# Patient Record
Sex: FEMALE | Race: WHITE | Employment: OTHER | URBAN - NONMETROPOLITAN AREA
[De-identification: names, ages, dates, MRNs, and addresses within clinical notes are randomized per-mention and may not be internally consistent; named-entity substitution may affect disease eponyms.]

---

## 2018-11-26 ENCOUNTER — HOSPITAL ENCOUNTER (OUTPATIENT)
Age: 83
Discharge: HOME | End: 2018-11-26
Payer: MEDICARE

## 2018-11-26 LAB
ANION GAP SERPL CALCULATED.3IONS-SCNC: 15 MEQ/L (ref 8–16)
BUN BLDV-MCNC: 22 MG/DL (ref 7–22)
CALCIUM SERPL-MCNC: 9.8 MG/DL (ref 8.5–10.5)
CHLORIDE BLD-SCNC: 102 MEQ/L (ref 98–111)
CO2: 23 MEQ/L (ref 23–33)
CREAT SERPL-MCNC: 0.8 MG/DL (ref 0.4–1.2)
GFR SERPL CREATININE-BSD FRML MDRD: 69 ML/MIN/1.73M2
GLUCOSE BLD-MCNC: 78 MG/DL (ref 70–108)
POTASSIUM SERPL-SCNC: 4.2 MEQ/L (ref 3.5–5.2)
SODIUM BLD-SCNC: 140 MEQ/L (ref 135–145)

## 2018-11-26 PROCEDURE — 80048 BASIC METABOLIC PNL TOTAL CA: CPT

## 2018-11-26 PROCEDURE — 36415 COLL VENOUS BLD VENIPUNCTURE: CPT

## 2018-12-05 ENCOUNTER — HOSPITAL ENCOUNTER (EMERGENCY)
Age: 83
Discharge: HOME | End: 2018-12-05
Attending: EMERGENCY MEDICINE
Payer: MEDICARE

## 2018-12-05 VITALS
SYSTOLIC BLOOD PRESSURE: 177 MMHG | HEART RATE: 75 BPM | HEIGHT: 66 IN | WEIGHT: 183 LBS | BODY MASS INDEX: 29.41 KG/M2 | OXYGEN SATURATION: 97 % | TEMPERATURE: 97 F | DIASTOLIC BLOOD PRESSURE: 95 MMHG | RESPIRATION RATE: 18 BRPM

## 2018-12-05 DIAGNOSIS — J20.9 ACUTE BRONCHITIS DUE TO INFECTION: Primary | ICD-10-CM

## 2018-12-05 DIAGNOSIS — Z79.01 WARFARIN ANTICOAGULATION: ICD-10-CM

## 2018-12-05 DIAGNOSIS — R03.0 ELEVATED BLOOD PRESSURE READING: ICD-10-CM

## 2018-12-05 DIAGNOSIS — I48.91 ATRIAL FIBRILLATION, UNSPECIFIED TYPE (HCC): ICD-10-CM

## 2018-12-05 PROCEDURE — 99213 OFFICE O/P EST LOW 20 MIN: CPT

## 2018-12-05 PROCEDURE — 99203 OFFICE O/P NEW LOW 30 MIN: CPT | Performed by: EMERGENCY MEDICINE

## 2018-12-05 RX ORDER — LATANOPROST 50 UG/ML
1 SOLUTION/ DROPS OPHTHALMIC NIGHTLY
COMMUNITY

## 2018-12-05 RX ORDER — CARVEDILOL 3.12 MG/1
3.12 TABLET ORAL 2 TIMES DAILY WITH MEALS
COMMUNITY

## 2018-12-05 RX ORDER — CEFDINIR 300 MG/1
300 CAPSULE ORAL 2 TIMES DAILY
Qty: 14 CAPSULE | Refills: 0 | Status: SHIPPED | OUTPATIENT
Start: 2018-12-05 | End: 2018-12-12

## 2018-12-05 RX ORDER — ISOSORBIDE MONONITRATE 60 MG/1
60 TABLET, EXTENDED RELEASE ORAL DAILY
COMMUNITY

## 2018-12-05 RX ORDER — SPIRONOLACTONE 25 MG/1
25 TABLET ORAL DAILY
COMMUNITY

## 2018-12-05 RX ORDER — TRAMADOL HYDROCHLORIDE 50 MG/1
50 TABLET ORAL EVERY 6 HOURS PRN
COMMUNITY

## 2018-12-05 RX ORDER — BENZONATATE 200 MG/1
200 CAPSULE ORAL 3 TIMES DAILY PRN
Qty: 15 CAPSULE | Refills: 0 | Status: SHIPPED | OUTPATIENT
Start: 2018-12-05 | End: 2018-12-20

## 2018-12-05 RX ORDER — FUROSEMIDE 40 MG/1
40 TABLET ORAL 2 TIMES DAILY
COMMUNITY

## 2018-12-05 RX ORDER — WARFARIN SODIUM 3 MG/1
TABLET ORAL DAILY
COMMUNITY

## 2018-12-05 RX ORDER — POTASSIUM CHLORIDE 1.5 G/1.77G
20 POWDER, FOR SOLUTION ORAL 3 TIMES DAILY
COMMUNITY

## 2018-12-05 ASSESSMENT — ENCOUNTER SYMPTOMS
NAUSEA: 0
BACK PAIN: 0
STRIDOR: 0
SHORTNESS OF BREATH: 0
SORE THROAT: 0
VOICE CHANGE: 0
BLOOD IN STOOL: 0
WHEEZING: 0
CONSTIPATION: 0
VOMITING: 0
EYE PAIN: 0
EYE DISCHARGE: 0
SINUS PRESSURE: 0
ABDOMINAL PAIN: 0
TROUBLE SWALLOWING: 0
RHINORRHEA: 1
DIARRHEA: 0
EYE REDNESS: 0
COUGH: 1
CHOKING: 0
FACIAL SWELLING: 0

## 2018-12-05 NOTE — ED PROVIDER NOTES
[address]       Chief Complaint   Patient presents with    Cough       Nurses Notes reviewed and I agree except as noted in the HPI. HISTORY OF PRESENT ILLNESS   Lionel Maldonado is a 81 y.o. female who presents With one-week history of cough productive of white sputum, fatigue, malaise, postnasal drainage, congestion. No fever, vomiting, chest pain, shortness of breath, leg pain or leg swelling, hemoptysis, dizziness, syncope, rash,  symptoms. No weight gain. From [place], currently visiting her daughter. History of hypertension, COPD, CHF, CAD, atrial fibrillation on Coumadin. On oxygen 2-2.5 L.   UTD Influenza, Pneumovax and Prevnar  REVIEW OF SYSTEMS     Review of Systems   Constitutional: Positive for appetite change. Negative for chills, fatigue, fever and unexpected weight change. HENT: Positive for congestion, postnasal drip and rhinorrhea. Negative for ear discharge, ear pain, facial swelling, hearing loss, nosebleeds, sinus pressure, sore throat, trouble swallowing and voice change. Eyes: Negative for pain, discharge, redness and visual disturbance. Respiratory: Positive for cough. Negative for choking, shortness of breath, wheezing and stridor. Cardiovascular: Negative for chest pain and leg swelling. Gastrointestinal: Negative for abdominal pain, blood in stool, constipation, diarrhea, nausea and vomiting. Genitourinary: Negative for dysuria, flank pain, frequency, hematuria, urgency, vaginal bleeding and vaginal discharge. Musculoskeletal: Negative for arthralgias, back pain, neck pain and neck stiffness. Skin: Negative for rash. Neurological: Negative for dizziness, seizures, syncope, weakness, light-headedness and headaches. Hematological: Negative for adenopathy. Does not bruise/bleed easily. Psychiatric/Behavioral: Negative for confusion, sleep disturbance and suicidal ideas.  The patient is not nervous/anxious. All other systems reviewed and are negative. PAST MEDICAL HISTORY         Diagnosis Date    Atrial fibrillation ([facility] [place] [address]     CAD (coronary artery disease)     CHF (congestive heart failure) (HCC)     COPD (chronic obstructive pulmonary disease) (HCC)     Glaucoma     Hypertension     Kidney stone        SURGICAL HISTORY     Patient  has a past surgical history that includes Coronary artery bypass graft; Cholecystectomy; Vein Surgery; Coronary angioplasty with stent; and Hysterectomy. CURRENT MEDICATIONS       Discharge Medication List as of 12/5/2018  4:54 PM      CONTINUE these medications which have NOT CHANGED    Details   Mirabegron ER (MYRBETRIQ) 50 MG TB24 Take by mouthHistorical Med      furosemide (LASIX) 40 MG tablet Take 40 mg by mouth 2 times dailyHistorical Med      latanoprost (XALATAN) 0.005 % ophthalmic solution 1 drop nightlyHistorical Med      OXYGEN Inhale into the lungsHistorical Med      potassium chloride (KLOR-CON) 20 MEQ packet Take 20 mEq by mouth 3 times dailyHistorical Med      spironolactone (ALDACTONE) 25 MG tablet Take 25 mg by mouth dailyHistorical Med      timolol (BETIMOL) 0.5 % ophthalmic solution 1 drop 2 times dailyHistorical Med      warfarin (COUMADIN) 3 MG tablet Take by mouth dailyHistorical Med      isosorbide mononitrate (IMDUR) 60 MG extended release tablet Take 60 mg by mouth dailyHistorical Med      carvedilol (COREG) 3.125 MG tablet Take 3.125 mg by mouth 2 times daily (with meals)Historical Med      traMADol (ULTRAM) 50 MG tablet Take 50 mg by mouth every 6 hours as needed for Pain. Michelle Sieve Historical Med             ALLERGIES     Patient is is allergic to pcn [penicillins]. FAMILY HISTORY     Patient'sfamily history is not on file. SOCIAL HISTORY     Patient  reports that she has never smoked. She has never used smokeless tobacco. She reports that she does not drink alcohol or use drugs.     PHYSICAL EXAM     ED TRIAGE VITALS  BP: (!) for Cough Caution swallow whole do not chew, Disp-15 capsule, R-0Print      cefdinir (OMNICEF) 300 MG capsule Take 1 capsule by mouth 2 times daily for 7 days, Disp-14 capsule, R-0Print           Discharge Medication List as of 12/5/2018  4:54 PM          MD Yenifer Baeza MD  12/05/18 1946

## 2024-01-06 ENCOUNTER — APPOINTMENT (OUTPATIENT)
Dept: GENERAL RADIOLOGY | Age: 89
DRG: 308 | End: 2024-01-06
Payer: MEDICARE

## 2024-01-06 ENCOUNTER — APPOINTMENT (OUTPATIENT)
Dept: INTERVENTIONAL RADIOLOGY/VASCULAR | Age: 89
DRG: 308 | End: 2024-01-06
Payer: MEDICARE

## 2024-01-06 ENCOUNTER — APPOINTMENT (OUTPATIENT)
Dept: CT IMAGING | Age: 89
DRG: 308 | End: 2024-01-06
Payer: MEDICARE

## 2024-01-06 ENCOUNTER — HOSPITAL ENCOUNTER (INPATIENT)
Age: 89
LOS: 2 days | Discharge: HOME | DRG: 308 | End: 2024-01-08
Attending: EMERGENCY MEDICINE | Admitting: INTERNAL MEDICINE
Payer: MEDICARE

## 2024-01-06 DIAGNOSIS — I48.91 ATRIAL FIBRILLATION, UNSPECIFIED TYPE (HCC): ICD-10-CM

## 2024-01-06 DIAGNOSIS — E87.0 HYPERNATREMIA: ICD-10-CM

## 2024-01-06 DIAGNOSIS — R09.89 DECREASED TISSUE PERFUSION: ICD-10-CM

## 2024-01-06 DIAGNOSIS — S89.92XS: ICD-10-CM

## 2024-01-06 DIAGNOSIS — E87.6 HYPOKALEMIA: ICD-10-CM

## 2024-01-06 DIAGNOSIS — I26.93 SINGLE SUBSEGMENTAL PULMONARY EMBOLISM WITHOUT ACUTE COR PULMONALE (HCC): Primary | ICD-10-CM

## 2024-01-06 DIAGNOSIS — R00.1 BRADYCARDIA: ICD-10-CM

## 2024-01-06 DIAGNOSIS — R00.1 SYMPTOMATIC BRADYCARDIA: ICD-10-CM

## 2024-01-06 DIAGNOSIS — S12.100S CLOSED ODONTOID FRACTURE, SEQUELA: ICD-10-CM

## 2024-01-06 DIAGNOSIS — S92.345A NONDISPLACED FRACTURE OF FOURTH METATARSAL BONE, LEFT FOOT, INITIAL ENCOUNTER FOR CLOSED FRACTURE: ICD-10-CM

## 2024-01-06 LAB
ALBUMIN SERPL BCG-MCNC: 3.6 G/DL (ref 3.5–5.1)
ALP SERPL-CCNC: 99 U/L (ref 38–126)
ALT SERPL W/O P-5'-P-CCNC: 8 U/L (ref 11–66)
ANION GAP SERPL CALC-SCNC: 11 MEQ/L (ref 8–16)
APTT PPP: 39.2 SECONDS (ref 22–38)
AST SERPL-CCNC: 12 U/L (ref 5–40)
BASOPHILS ABSOLUTE: 0 THOU/MM3 (ref 0–0.1)
BASOPHILS NFR BLD AUTO: 0.4 %
BILIRUB SERPL-MCNC: 0.4 MG/DL (ref 0.3–1.2)
BUN SERPL-MCNC: 13 MG/DL (ref 7–22)
CALCIUM SERPL-MCNC: 9 MG/DL (ref 8.5–10.5)
CHLORIDE SERPL-SCNC: 113 MEQ/L (ref 98–111)
CO2 SERPL-SCNC: 25 MEQ/L (ref 23–33)
CREAT SERPL-MCNC: 0.7 MG/DL (ref 0.4–1.2)
DEPRECATED RDW RBC AUTO: 54 FL (ref 35–45)
EOSINOPHIL NFR BLD AUTO: 1.4 %
EOSINOPHILS ABSOLUTE: 0.1 THOU/MM3 (ref 0–0.4)
ERYTHROCYTE [DISTWIDTH] IN BLOOD BY AUTOMATED COUNT: 15.1 % (ref 11.5–14.5)
GFR SERPL CREATININE-BSD FRML MDRD: > 60 ML/MIN/1.73M2
GLUCOSE SERPL-MCNC: 95 MG/DL (ref 70–108)
HCT VFR BLD AUTO: 38 % (ref 37–47)
HGB BLD-MCNC: 12.3 GM/DL (ref 12–16)
IMM GRANULOCYTES # BLD AUTO: 0.02 THOU/MM3 (ref 0–0.07)
IMM GRANULOCYTES NFR BLD AUTO: 0.4 %
INR PPP: 1.22 (ref 0.85–1.13)
LYMPHOCYTES ABSOLUTE: 1.4 THOU/MM3 (ref 1–4.8)
LYMPHOCYTES NFR BLD AUTO: 28.5 %
MCH RBC QN AUTO: 32.8 PG (ref 26–33)
MCHC RBC AUTO-ENTMCNC: 32.4 GM/DL (ref 32.2–35.5)
MCV RBC AUTO: 101.3 FL (ref 81–99)
MONOCYTES ABSOLUTE: 0.4 THOU/MM3 (ref 0.4–1.3)
MONOCYTES NFR BLD AUTO: 8.8 %
NEUTROPHILS NFR BLD AUTO: 60.5 %
NRBC BLD AUTO-RTO: 0 /100 WBC
OSMOLALITY SERPL CALC.SUM OF ELEC: 296.1 MOSMOL/KG (ref 275–300)
PLATELET # BLD AUTO: 175 THOU/MM3 (ref 130–400)
PMV BLD AUTO: 9.9 FL (ref 9.4–12.4)
POTASSIUM SERPL-SCNC: 2.8 MEQ/L (ref 3.5–5.2)
PROT SERPL-MCNC: 5.4 G/DL (ref 6.1–8)
RBC # BLD AUTO: 3.75 MILL/MM3 (ref 4.2–5.4)
SEGMENTED NEUTROPHILS ABSOLUTE COUNT: 3 THOU/MM3 (ref 1.8–7.7)
SODIUM SERPL-SCNC: 149 MEQ/L (ref 135–145)
TROPONIN, HIGH SENSITIVITY: 21 NG/L (ref 0–12)
WBC # BLD AUTO: 5 THOU/MM3 (ref 4.8–10.8)

## 2024-01-06 PROCEDURE — 6360000004 HC RX CONTRAST MEDICATION: Performed by: EMERGENCY MEDICINE

## 2024-01-06 PROCEDURE — 93931 UPPER EXTREMITY STUDY: CPT

## 2024-01-06 PROCEDURE — 85610 PROTHROMBIN TIME: CPT

## 2024-01-06 PROCEDURE — 71275 CT ANGIOGRAPHY CHEST: CPT

## 2024-01-06 PROCEDURE — 85730 THROMBOPLASTIN TIME PARTIAL: CPT

## 2024-01-06 PROCEDURE — 85025 COMPLETE CBC W/AUTO DIFF WBC: CPT

## 2024-01-06 PROCEDURE — 96375 TX/PRO/DX INJ NEW DRUG ADDON: CPT

## 2024-01-06 PROCEDURE — 2140000000 HC CCU INTERMEDIATE R&B

## 2024-01-06 PROCEDURE — 80053 COMPREHEN METABOLIC PANEL: CPT

## 2024-01-06 PROCEDURE — 84484 ASSAY OF TROPONIN QUANT: CPT

## 2024-01-06 PROCEDURE — 85520 HEPARIN ASSAY: CPT

## 2024-01-06 PROCEDURE — 73564 X-RAY EXAM KNEE 4 OR MORE: CPT

## 2024-01-06 PROCEDURE — 93005 ELECTROCARDIOGRAM TRACING: CPT | Performed by: EMERGENCY MEDICINE

## 2024-01-06 PROCEDURE — 6360000002 HC RX W HCPCS: Performed by: EMERGENCY MEDICINE

## 2024-01-06 PROCEDURE — 96376 TX/PRO/DX INJ SAME DRUG ADON: CPT

## 2024-01-06 PROCEDURE — 93925 LOWER EXTREMITY STUDY: CPT

## 2024-01-06 PROCEDURE — 96365 THER/PROPH/DIAG IV INF INIT: CPT

## 2024-01-06 PROCEDURE — 99285 EMERGENCY DEPT VISIT HI MDM: CPT

## 2024-01-06 PROCEDURE — 36415 COLL VENOUS BLD VENIPUNCTURE: CPT

## 2024-01-06 PROCEDURE — 73610 X-RAY EXAM OF ANKLE: CPT

## 2024-01-06 PROCEDURE — 2580000003 HC RX 258: Performed by: EMERGENCY MEDICINE

## 2024-01-06 PROCEDURE — 73630 X-RAY EXAM OF FOOT: CPT

## 2024-01-06 PROCEDURE — 73590 X-RAY EXAM OF LOWER LEG: CPT

## 2024-01-06 RX ORDER — SODIUM CHLORIDE 0.9 % (FLUSH) 0.9 %
5-40 SYRINGE (ML) INJECTION EVERY 12 HOURS SCHEDULED
Status: DISCONTINUED | OUTPATIENT
Start: 2024-01-07 | End: 2024-01-08 | Stop reason: HOSPADM

## 2024-01-06 RX ORDER — SODIUM CHLORIDE 0.9 % (FLUSH) 0.9 %
5-40 SYRINGE (ML) INJECTION PRN
Status: DISCONTINUED | OUTPATIENT
Start: 2024-01-06 | End: 2024-01-08 | Stop reason: HOSPADM

## 2024-01-06 RX ORDER — MAGNESIUM SULFATE IN WATER 40 MG/ML
2000 INJECTION, SOLUTION INTRAVENOUS PRN
Status: DISCONTINUED | OUTPATIENT
Start: 2024-01-06 | End: 2024-01-08 | Stop reason: HOSPADM

## 2024-01-06 RX ORDER — OXCARBAZEPINE 300 MG/1
300 TABLET, FILM COATED ORAL 2 TIMES DAILY
COMMUNITY

## 2024-01-06 RX ORDER — ONDANSETRON 2 MG/ML
4 INJECTION INTRAMUSCULAR; INTRAVENOUS ONCE
Status: COMPLETED | OUTPATIENT
Start: 2024-01-06 | End: 2024-01-06

## 2024-01-06 RX ORDER — ARIPIPRAZOLE 2 MG/1
2 TABLET ORAL NIGHTLY
COMMUNITY

## 2024-01-06 RX ORDER — HEPARIN SODIUM 1000 [USP'U]/ML
80 INJECTION, SOLUTION INTRAVENOUS; SUBCUTANEOUS ONCE
Status: COMPLETED | OUTPATIENT
Start: 2024-01-06 | End: 2024-01-06

## 2024-01-06 RX ORDER — HEPARIN SODIUM 1000 [USP'U]/ML
80 INJECTION, SOLUTION INTRAVENOUS; SUBCUTANEOUS ONCE
Status: DISCONTINUED | OUTPATIENT
Start: 2024-01-06 | End: 2024-01-06

## 2024-01-06 RX ORDER — ONDANSETRON 2 MG/ML
4 INJECTION INTRAMUSCULAR; INTRAVENOUS EVERY 6 HOURS PRN
Status: DISCONTINUED | OUTPATIENT
Start: 2024-01-06 | End: 2024-01-08 | Stop reason: HOSPADM

## 2024-01-06 RX ORDER — POLYETHYLENE GLYCOL 3350 17 G/17G
17 POWDER, FOR SOLUTION ORAL DAILY PRN
Status: DISCONTINUED | OUTPATIENT
Start: 2024-01-06 | End: 2024-01-08 | Stop reason: HOSPADM

## 2024-01-06 RX ORDER — ACETAMINOPHEN 325 MG/1
650 TABLET ORAL EVERY 6 HOURS PRN
Status: DISCONTINUED | OUTPATIENT
Start: 2024-01-06 | End: 2024-01-08 | Stop reason: HOSPADM

## 2024-01-06 RX ORDER — ACETAMINOPHEN 650 MG/1
650 SUPPOSITORY RECTAL EVERY 6 HOURS PRN
Status: DISCONTINUED | OUTPATIENT
Start: 2024-01-06 | End: 2024-01-08 | Stop reason: HOSPADM

## 2024-01-06 RX ORDER — POTASSIUM CHLORIDE 20 MEQ/1
60 TABLET, EXTENDED RELEASE ORAL ONCE
Status: DISCONTINUED | OUTPATIENT
Start: 2024-01-06 | End: 2024-01-08 | Stop reason: HOSPADM

## 2024-01-06 RX ORDER — IBUPROFEN 600 MG/1
1 TABLET ORAL ONCE
Status: COMPLETED | OUTPATIENT
Start: 2024-01-06 | End: 2024-01-06

## 2024-01-06 RX ORDER — POTASSIUM CHLORIDE 20 MEQ/1
40 TABLET, EXTENDED RELEASE ORAL PRN
Status: DISCONTINUED | OUTPATIENT
Start: 2024-01-06 | End: 2024-01-08 | Stop reason: HOSPADM

## 2024-01-06 RX ORDER — SACUBITRIL AND VALSARTAN 24; 26 MG/1; MG/1
1 TABLET, FILM COATED ORAL 2 TIMES DAILY
COMMUNITY

## 2024-01-06 RX ORDER — ONDANSETRON 4 MG/1
4 TABLET, ORALLY DISINTEGRATING ORAL EVERY 8 HOURS PRN
Status: DISCONTINUED | OUTPATIENT
Start: 2024-01-06 | End: 2024-01-08 | Stop reason: HOSPADM

## 2024-01-06 RX ORDER — HEPARIN SODIUM 10000 [USP'U]/100ML
5-30 INJECTION, SOLUTION INTRAVENOUS CONTINUOUS
Status: DISCONTINUED | OUTPATIENT
Start: 2024-01-06 | End: 2024-01-08 | Stop reason: HOSPADM

## 2024-01-06 RX ORDER — POTASSIUM CHLORIDE 7.45 MG/ML
10 INJECTION INTRAVENOUS ONCE
Status: COMPLETED | OUTPATIENT
Start: 2024-01-06 | End: 2024-01-07

## 2024-01-06 RX ORDER — MIRTAZAPINE 15 MG/1
15 TABLET, FILM COATED ORAL NIGHTLY
COMMUNITY

## 2024-01-06 RX ORDER — ESCITALOPRAM OXALATE 10 MG/1
10 TABLET ORAL DAILY
COMMUNITY

## 2024-01-06 RX ORDER — SODIUM CHLORIDE 9 MG/ML
INJECTION, SOLUTION INTRAVENOUS PRN
Status: DISCONTINUED | OUTPATIENT
Start: 2024-01-06 | End: 2024-01-08 | Stop reason: HOSPADM

## 2024-01-06 RX ORDER — POTASSIUM CHLORIDE 7.45 MG/ML
10 INJECTION INTRAVENOUS PRN
Status: DISCONTINUED | OUTPATIENT
Start: 2024-01-06 | End: 2024-01-08 | Stop reason: HOSPADM

## 2024-01-06 RX ORDER — 0.9 % SODIUM CHLORIDE 0.9 %
250 INTRAVENOUS SOLUTION INTRAVENOUS ONCE
Status: COMPLETED | OUTPATIENT
Start: 2024-01-06 | End: 2024-01-06

## 2024-01-06 RX ADMIN — POTASSIUM CHLORIDE 10 MEQ: 7.46 INJECTION, SOLUTION INTRAVENOUS at 22:49

## 2024-01-06 RX ADMIN — ONDANSETRON 4 MG: 2 INJECTION INTRAMUSCULAR; INTRAVENOUS at 22:14

## 2024-01-06 RX ADMIN — Medication 1 MG: at 22:19

## 2024-01-06 RX ADMIN — GLUCAGON 1 MG: KIT at 21:30

## 2024-01-06 RX ADMIN — HEPARIN SODIUM 18 UNITS/KG/HR: 10000 INJECTION, SOLUTION INTRAVENOUS at 23:43

## 2024-01-06 RX ADMIN — IOPAMIDOL 80 ML: 755 INJECTION, SOLUTION INTRAVENOUS at 22:29

## 2024-01-06 RX ADMIN — HEPARIN SODIUM 4350 UNITS: 1000 INJECTION INTRAVENOUS; SUBCUTANEOUS at 23:39

## 2024-01-06 RX ADMIN — SODIUM CHLORIDE 250 ML: 9 INJECTION, SOLUTION INTRAVENOUS at 22:47

## 2024-01-06 ASSESSMENT — PAIN SCALES - GENERAL: PAINLEVEL_OUTOF10: 0

## 2024-01-06 ASSESSMENT — PAIN - FUNCTIONAL ASSESSMENT: PAIN_FUNCTIONAL_ASSESSMENT: 0-10

## 2024-01-07 PROBLEM — I26.93 SINGLE SUBSEGMENTAL PULMONARY EMBOLISM WITHOUT ACUTE COR PULMONALE (HCC): Status: ACTIVE | Noted: 2024-01-07

## 2024-01-07 LAB
ANION GAP SERPL CALC-SCNC: 11 MEQ/L (ref 8–16)
ANION GAP SERPL CALC-SCNC: 12 MEQ/L (ref 8–16)
APTT PPP: 121.8 SECONDS (ref 22–38)
APTT PPP: 74.6 SECONDS (ref 22–38)
APTT PPP: 96.5 SECONDS (ref 22–38)
BASOPHILS ABSOLUTE: 0 THOU/MM3 (ref 0–0.1)
BASOPHILS NFR BLD AUTO: 0.4 %
BUN SERPL-MCNC: 12 MG/DL (ref 7–22)
BUN SERPL-MCNC: 13 MG/DL (ref 7–22)
CALCIUM SERPL-MCNC: 8.5 MG/DL (ref 8.5–10.5)
CALCIUM SERPL-MCNC: 8.6 MG/DL (ref 8.5–10.5)
CHLORIDE SERPL-SCNC: 111 MEQ/L (ref 98–111)
CHLORIDE SERPL-SCNC: 112 MEQ/L (ref 98–111)
CO2 SERPL-SCNC: 20 MEQ/L (ref 23–33)
CO2 SERPL-SCNC: 21 MEQ/L (ref 23–33)
CREAT SERPL-MCNC: 0.5 MG/DL (ref 0.4–1.2)
CREAT SERPL-MCNC: 0.6 MG/DL (ref 0.4–1.2)
DEPRECATED RDW RBC AUTO: 57.6 FL (ref 35–45)
EKG ATRIAL RATE: 54 BPM
EKG Q-T INTERVAL: 516 MS
EKG Q-T INTERVAL: 588 MS
EKG QRS DURATION: 146 MS
EKG QRS DURATION: 154 MS
EKG QTC CALCULATION (BAZETT): 489 MS
EKG QTC CALCULATION (BAZETT): 520 MS
EKG R AXIS: 82 DEGREES
EKG R AXIS: 86 DEGREES
EKG T AXIS: 73 DEGREES
EKG T AXIS: 93 DEGREES
EKG VENTRICULAR RATE: 47 BPM
EKG VENTRICULAR RATE: 54 BPM
EOSINOPHIL NFR BLD AUTO: 1.6 %
EOSINOPHILS ABSOLUTE: 0.1 THOU/MM3 (ref 0–0.4)
ERYTHROCYTE [DISTWIDTH] IN BLOOD BY AUTOMATED COUNT: 15.5 % (ref 11.5–14.5)
GFR SERPL CREATININE-BSD FRML MDRD: > 60 ML/MIN/1.73M2
GFR SERPL CREATININE-BSD FRML MDRD: > 60 ML/MIN/1.73M2
GLUCOSE SERPL-MCNC: 76 MG/DL (ref 70–108)
GLUCOSE SERPL-MCNC: 90 MG/DL (ref 70–108)
HCT VFR BLD AUTO: 37 % (ref 37–47)
HEPARIN UNFRACTIONATED: 0.46 U/ML (ref 0.3–0.7)
HGB BLD-MCNC: 11.4 GM/DL (ref 12–16)
IMM GRANULOCYTES # BLD AUTO: 0.01 THOU/MM3 (ref 0–0.07)
IMM GRANULOCYTES NFR BLD AUTO: 0.2 %
LYMPHOCYTES ABSOLUTE: 1.2 THOU/MM3 (ref 1–4.8)
LYMPHOCYTES NFR BLD AUTO: 25.1 %
MAGNESIUM SERPL-MCNC: 2.2 MG/DL (ref 1.6–2.4)
MAGNESIUM SERPL-MCNC: 2.3 MG/DL (ref 1.6–2.4)
MCH RBC QN AUTO: 32.3 PG (ref 26–33)
MCHC RBC AUTO-ENTMCNC: 30.8 GM/DL (ref 32.2–35.5)
MCV RBC AUTO: 104.8 FL (ref 81–99)
MONOCYTES ABSOLUTE: 0.4 THOU/MM3 (ref 0.4–1.3)
MONOCYTES NFR BLD AUTO: 7.7 %
NEUTROPHILS NFR BLD AUTO: 65 %
NRBC BLD AUTO-RTO: 0 /100 WBC
PLATELET # BLD AUTO: 167 THOU/MM3 (ref 130–400)
PMV BLD AUTO: 10 FL (ref 9.4–12.4)
POTASSIUM SERPL-SCNC: 3.2 MEQ/L (ref 3.5–5.2)
POTASSIUM SERPL-SCNC: 3.2 MEQ/L (ref 3.5–5.2)
RBC # BLD AUTO: 3.53 MILL/MM3 (ref 4.2–5.4)
SEGMENTED NEUTROPHILS ABSOLUTE COUNT: 3.2 THOU/MM3 (ref 1.8–7.7)
SODIUM SERPL-SCNC: 142 MEQ/L (ref 135–145)
SODIUM SERPL-SCNC: 145 MEQ/L (ref 135–145)
WBC # BLD AUTO: 4.9 THOU/MM3 (ref 4.8–10.8)

## 2024-01-07 PROCEDURE — 36415 COLL VENOUS BLD VENIPUNCTURE: CPT

## 2024-01-07 PROCEDURE — 6370000000 HC RX 637 (ALT 250 FOR IP)

## 2024-01-07 PROCEDURE — 85730 THROMBOPLASTIN TIME PARTIAL: CPT

## 2024-01-07 PROCEDURE — 93010 ELECTROCARDIOGRAM REPORT: CPT | Performed by: INTERNAL MEDICINE

## 2024-01-07 PROCEDURE — 93005 ELECTROCARDIOGRAM TRACING: CPT

## 2024-01-07 PROCEDURE — 99223 1ST HOSP IP/OBS HIGH 75: CPT | Performed by: INTERNAL MEDICINE

## 2024-01-07 PROCEDURE — 80048 BASIC METABOLIC PNL TOTAL CA: CPT

## 2024-01-07 PROCEDURE — 2700000000 HC OXYGEN THERAPY PER DAY

## 2024-01-07 PROCEDURE — 99223 1ST HOSP IP/OBS HIGH 75: CPT

## 2024-01-07 PROCEDURE — 2140000000 HC CCU INTERMEDIATE R&B

## 2024-01-07 PROCEDURE — 83735 ASSAY OF MAGNESIUM: CPT

## 2024-01-07 PROCEDURE — 85025 COMPLETE CBC W/AUTO DIFF WBC: CPT

## 2024-01-07 PROCEDURE — 94761 N-INVAS EAR/PLS OXIMETRY MLT: CPT

## 2024-01-07 RX ORDER — ARIPIPRAZOLE 2 MG/1
2 TABLET ORAL NIGHTLY
Status: DISCONTINUED | OUTPATIENT
Start: 2024-01-07 | End: 2024-01-08 | Stop reason: HOSPADM

## 2024-01-07 RX ORDER — CARVEDILOL 3.12 MG/1
3.12 TABLET ORAL 2 TIMES DAILY WITH MEALS
Status: DISCONTINUED | OUTPATIENT
Start: 2024-01-07 | End: 2024-01-08 | Stop reason: HOSPADM

## 2024-01-07 RX ORDER — ALBUTEROL SULFATE 2.5 MG/3ML
2.5 SOLUTION RESPIRATORY (INHALATION) EVERY 6 HOURS PRN
Status: DISCONTINUED | OUTPATIENT
Start: 2024-01-07 | End: 2024-01-07

## 2024-01-07 RX ORDER — LATANOPROST 50 UG/ML
1 SOLUTION/ DROPS OPHTHALMIC NIGHTLY
Status: DISCONTINUED | OUTPATIENT
Start: 2024-01-07 | End: 2024-01-08 | Stop reason: HOSPADM

## 2024-01-07 RX ORDER — SODIUM CHLORIDE, SODIUM LACTATE, POTASSIUM CHLORIDE, CALCIUM CHLORIDE 600; 310; 30; 20 MG/100ML; MG/100ML; MG/100ML; MG/100ML
INJECTION, SOLUTION INTRAVENOUS CONTINUOUS
Status: DISCONTINUED | OUTPATIENT
Start: 2024-01-07 | End: 2024-01-08 | Stop reason: HOSPADM

## 2024-01-07 RX ORDER — ESCITALOPRAM OXALATE 10 MG/1
10 TABLET ORAL DAILY
Status: DISCONTINUED | OUTPATIENT
Start: 2024-01-07 | End: 2024-01-08 | Stop reason: HOSPADM

## 2024-01-07 RX ORDER — SPIRONOLACTONE 25 MG/1
25 TABLET ORAL DAILY
Status: DISCONTINUED | OUTPATIENT
Start: 2024-01-07 | End: 2024-01-08 | Stop reason: HOSPADM

## 2024-01-07 RX ORDER — MIRTAZAPINE 15 MG/1
15 TABLET, FILM COATED ORAL NIGHTLY
Status: DISCONTINUED | OUTPATIENT
Start: 2024-01-07 | End: 2024-01-08 | Stop reason: HOSPADM

## 2024-01-07 RX ORDER — SODIUM CHLORIDE, SODIUM LACTATE, POTASSIUM CHLORIDE, CALCIUM CHLORIDE 600; 310; 30; 20 MG/100ML; MG/100ML; MG/100ML; MG/100ML
INJECTION, SOLUTION INTRAVENOUS CONTINUOUS
Status: DISCONTINUED | OUTPATIENT
Start: 2024-01-07 | End: 2024-01-07

## 2024-01-07 RX ORDER — ALBUTEROL SULFATE 2.5 MG/3ML
2.5 SOLUTION RESPIRATORY (INHALATION) EVERY 4 HOURS PRN
Status: DISCONTINUED | OUTPATIENT
Start: 2024-01-07 | End: 2024-01-08 | Stop reason: HOSPADM

## 2024-01-07 RX ORDER — TIMOLOL MALEATE 5 MG/ML
1 SOLUTION/ DROPS OPHTHALMIC 2 TIMES DAILY
Status: DISCONTINUED | OUTPATIENT
Start: 2024-01-07 | End: 2024-01-08 | Stop reason: HOSPADM

## 2024-01-07 RX ORDER — HYDROCODONE BITARTRATE AND ACETAMINOPHEN 5; 325 MG/1; MG/1
1 TABLET ORAL EVERY 6 HOURS PRN
Status: DISCONTINUED | OUTPATIENT
Start: 2024-01-07 | End: 2024-01-08 | Stop reason: HOSPADM

## 2024-01-07 RX ORDER — OXCARBAZEPINE 300 MG/1
300 TABLET, FILM COATED ORAL 2 TIMES DAILY
Status: DISCONTINUED | OUTPATIENT
Start: 2024-01-07 | End: 2024-01-08 | Stop reason: HOSPADM

## 2024-01-07 RX ADMIN — LATANOPROST 1 DROP: 50 SOLUTION OPHTHALMIC at 23:53

## 2024-01-07 RX ADMIN — TIMOLOL MALEATE 1 DROP: 5 SOLUTION OPHTHALMIC at 13:24

## 2024-01-07 RX ADMIN — POTASSIUM BICARBONATE 40 MEQ: 782 TABLET, EFFERVESCENT ORAL at 15:37

## 2024-01-07 ASSESSMENT — LIFESTYLE VARIABLES
HOW MANY STANDARD DRINKS CONTAINING ALCOHOL DO YOU HAVE ON A TYPICAL DAY: PATIENT DOES NOT DRINK
HOW OFTEN DO YOU HAVE A DRINK CONTAINING ALCOHOL: NEVER

## 2024-01-07 NOTE — PLAN OF CARE
This patient was admitted early this a.m. (1/7) due to cyanotic fingers.  Patient is originally from [place] and was in town for her daughter's wedding.  Family noticed that her digits were dusky.  Patient has also been more tired and short of breath in the last few days.  And that is why a family members brought her to the emergency department.  Patient was found to have atrial fibrillation with slow ventricular response versus junctional rhythm.  Patient's heart rate this afternoon has been anywhere between 43 and 75 per RN.  Patient's potassium also came back at 3.2 this afternoon.  This will be replaced.    This a.m., patient's POA and this provider had a long discussion about goals of care and what medical treatment could potentially look like.  The family would like for her to go back to [place] as soon as possible and understands that hospice might be the best option.  Patient has had multiple falls recently and fractured C1 and C2 recently.  Family would like to speak with cardiology and see if pacemaker is some that would help benefit her life.    Junctional rhythm versus atrial fibrillation with slow ventricular response: Cardiology consulted.  Telemetry.  Continue to treat hypokalemia.    Subsegmental PE: Heparin drip at this time.  Patient is on Eliquis at home but low-dose, patient will need to be on full dose on discharge.    Hypokalemia: 2.8 on admission.  Recheck 3.2.  Will give another dose of potassium and recheck in the a.m.    Recurrent falls  Cyanotic digits  Ischemic cardiomyopathy    Electronically signed by NYLA Henderson on 1/7/2024 at 3:34 PM

## 2024-01-07 NOTE — ED NOTES
ED to inpatient nurses report      Chief Complaint:  Chief Complaint   Patient presents with    Discoloration of Fingers     Present to ED from: home    MOA:     LOC: alert and orientated to name and place  Mobility: Fully dependent  Oxygen Baseline: 2 L    Current needs required: 2 L     Code Status:   Limited    What abnormal results were found and what did you give/do to treat them? Bradycardia/PE    Mental Status:  Level of Consciousness: Alert (0)    Psych Assessment:        Vitals:  Patient Vitals for the past 24 hrs:   BP Temp Temp src Pulse Resp SpO2 Height Weight   01/07/24 0525 (!) 131/57 -- -- 54 19 98 % -- --   01/07/24 0421 -- -- -- 50 19 100 % -- --   01/07/24 0254 117/76 -- -- 58 23 98 % -- --   01/07/24 0249 -- -- -- 56 19 99 % -- --   01/07/24 0204 -- -- -- -- -- -- 1.676 m (5' 6\") --   01/07/24 0154 (!) 123/95 -- -- 74 22 -- -- --   01/07/24 0131 -- -- -- 57 18 97 % -- --   01/07/24 0002 -- -- -- (!) 49 19 98 % -- --   01/06/24 2348 -- -- -- 55 17 98 % -- --   01/06/24 2251 -- -- -- (!) 43 19 92 % -- --   01/06/24 2249 -- -- -- (!) 40 18 91 % -- --   01/06/24 2223 133/65 -- -- (!) 48 24 -- -- --   01/06/24 2222 -- -- -- 57 19 -- -- --   01/06/24 2139 -- -- -- 56 23 -- -- --   01/06/24 2138 (!) 161/81 -- -- 51 17 -- -- --   01/06/24 2108 (!) 127/58 -- -- (!) 41 20 -- -- --   01/06/24 1935 133/77 97.8 °F (36.6 °C) Oral (!) 46 24 100 % -- 54.4 kg (120 lb)        LDAs:   Peripheral IV 01/07/24 Right Forearm (Active)   Site Assessment Clean, dry & intact 01/07/24 0230   Line Status Infusing 01/07/24 0230   Phlebitis Assessment No symptoms 01/07/24 0230   Infiltration Assessment 0 01/07/24 0230   Dressing Status Clean, dry & intact 01/07/24 0230       Ambulatory Status:  No data recorded    Diagnosis:  DISPOSITION Admitted 01/06/2024 11:51:45 PM   Final diagnoses:   Atrial fibrillation, unspecified type (HCC)   Bradycardia   Lower extremity injury, left, sequela   Nondisplaced fracture of fourth

## 2024-01-07 NOTE — CONSULTS
[facility]  Cardiology Consult        Patient:  Tamica Izquierdo  YOB: 1935  MRN: [id number]     Acct: [id number]    PCP: No primary care provider on file.    Date of Admission: 1/6/2024      Reason for Consultation:  Bradycardia      History Of Present Illness:    88 y.o. female with history of COPD, chronic systolic heart failure with ejection fraction of 30% according to family, frequent falls who brought into the ER for dusky fingers.  Most of the history was obtained from the family who are present in the room.  Daughter is in hospice nurse in [place].  They were apparently in [place] for a wedding.  According to family patient has been weak and unable to walk up stairs.  Had frequent falls.  Family initially stated that they did not want any workup.  They wanted palliative care only.  Patient's heart rate has been as low as 30 bpm.  Was previously told about needing a pacemaker but family refused.  Today after extensive discussion family is requesting to get an echocardiogram.  They are considering possible pacemaker.      All labs, EKG's, diagnostic testing and images as well as cardiac cath, stress testing were reviewed during this encounter.    Past Medical History:          Diagnosis Date    Atrial fibrillation (HCC)     CAD (coronary artery disease)     CHF (congestive heart failure) (HCC)     COPD (chronic obstructive pulmonary disease) (HCC)     Glaucoma     Hypertension     Kidney stone        Past Surgical History:          Procedure Laterality Date    CHOLECYSTECTOMY      CORONARY ANGIOPLASTY WITH STENT PLACEMENT      CORONARY ARTERY BYPASS GRAFT      HYSTERECTOMY (CERVIX STATUS UNKNOWN)      VEIN SURGERY         Medications Prior to Admission:      Prior to Admission medications    Medication Sig Start Date End Date Taking? Authorizing Provider   apixaban (ELIQUIS) 2.5 MG TABS tablet Take 1 tablet by mouth 2 times daily   Yes Provider, MD Ken

## 2024-01-07 NOTE — ED PROVIDER NOTES
[facility] EMERGENCY DEPT      EMERGENCY MEDICINE     Pt Name: Tamica Izquierdo  MRN: [id number]  Birthdate 1935  Date of evaluation: 1/6/2024  Provider: TRACEY GLEASON DO, FACEP    CHIEF COMPLAINT       Chief Complaint   Patient presents with    Discoloration of Fingers     HISTORY OF PRESENT ILLNESS   Tamica Izquierdo is a pleasant 88 y.o. female who presents to the emergency department for evaluation of generalized weakness and finger discoloration.  Patient's daughter, who is an RN and her power of  brings the patient in, having noticed the symptoms earlier today.  Patient apparently has sustained several falls over the last few weeks, last 1 being 4 to 5 days ago.  1 of these falls she fractured her odontoid and injured her LLE which has been swollen since.   Etiologies of the falls are unclear. She was seen at an outlying facility and had xrays of both femurs done which was negative.      Today, apparently patient has been very weak. Family noted her R hand to have cyanotic discoloration (mostly 4th and 5th digits) and therefore bring her in.  No falls today.    There have been some discussion in the past about pacemaker versus doing a Watchman, however the decision was made to hold off.  She is maintained on eliquis for her AFib and has been compliant with it.  The patient is not a great historian, states she has been more short of breath recently but denies any chest pain and she is unable to further elaborate on the dyspnea.      PASTMEDICAL HISTORY/Co-Morbid Conditions:     Past Medical History:   Diagnosis Date    Atrial fibrillation (HCC)     CAD (coronary artery disease)     CHF (congestive heart failure) (HCC)     COPD (chronic obstructive pulmonary disease) (HCC)     Glaucoma     Hypertension     Kidney stone        There is no problem list on file for this patient.    SURGICAL HISTORY       Past Surgical History:   Procedure Laterality Date    CHOLECYSTECTOMY      CORONARY ANGIOPLASTY WITH

## 2024-01-07 NOTE — ED TRIAGE NOTES
Pt to ED with family with c/o discoloration of her fingers. Family states that they noticed her fingers had a blue hue to them earlier this afternoon and were cold. Upon arrival there is noticeable discoloration to the right pinky on the palm side. Family states that pt did have a blood clot in that arm a few months ago. Pt denies pain but states that that finger is numb. Pt placed on tele. Pt in afib and is [facility]. Lowest was 32 bpm. Pt is on eliquis. Also has fracture in c spine.

## 2024-01-07 NOTE — H&P
Hospitalist History & Physical    Patient:  Tamica Izquierdo    Unit/Bed:12/012A  YOB: 1935  MRN: [id number]   Acct: [id number]   PCP: No primary care provider on file.  Code Status: Limited    Date of Service: Pt seen/examined on 01/07/24 and admitted to Inpatient with expected LOS greater than two midnights due to medical therapy.     Chief Complaint: Discoloration of fingers    Assessment/Plan:    Junctional rhythm vs atrial fibrillation with slow ventricular response: Has hx of SVR. Symptomatic. Has declined pacemaker in the past. Rate has ranged from 29-47 (+) increased shortness of breath, weakness and fatigue. Cyanotic fingers..  On Coreg 3.125 S/p Glucagon administration X 2 in ED.  Telemetry.   Discussion with family and ED provider-planning to monitor patient overnight and correct electrolyte disturbance to see if bradycardia improves.   Consult cardiology.   On Eliquis- held.  Currently on Heparin for PE.     Subsegmental pulmonary embolism: (+) shortness of breath.  Reports of hypoxia and the patient is on 2 liters nasal cannula (chronically on 2.5  liters at home), however, no documentation found of hypoxia since admission.  CTA chest revealed a small pulmonary embolism in distal left pulmonary artery, extending into the lingular branch.   Continue Heparin gtt.     Hypokalemia: 2.8 on admission.  Likely secondary to diuretic use.  Given 60 meq PO and 10 meq of potassium chloride in ED.    Recheck BMP in AM.  Continue to replace per protocol.   Telemetry.  Diuretics currently on hold.     Hypernatremia: Likely due to poor PO intake and diuretic use.   Hold diuretics.  Will initiate careful IV hydration with careful attention to patient's volume status given likely poor cardiac output state.     Recurrent falls: Appear to be mechanical in nature. On Eliquis for atrial fibrillation.  Discussed possibility of Watchman but was declined at the time.   PT/OT    Cyanotic digits of the right

## 2024-01-08 VITALS
BODY MASS INDEX: 22.14 KG/M2 | OXYGEN SATURATION: 97 % | TEMPERATURE: 97.7 F | DIASTOLIC BLOOD PRESSURE: 73 MMHG | HEIGHT: 66 IN | RESPIRATION RATE: 18 BRPM | WEIGHT: 137.79 LBS | SYSTOLIC BLOOD PRESSURE: 154 MMHG | HEART RATE: 62 BPM

## 2024-01-08 LAB
ANION GAP SERPL CALC-SCNC: 6 MEQ/L (ref 8–16)
APTT PPP: 84.6 SECONDS (ref 22–38)
BASOPHILS ABSOLUTE: 0 THOU/MM3 (ref 0–0.1)
BASOPHILS NFR BLD AUTO: 0.6 %
BUN SERPL-MCNC: 11 MG/DL (ref 7–22)
CALCIUM SERPL-MCNC: 9.2 MG/DL (ref 8.5–10.5)
CHLORIDE SERPL-SCNC: 113 MEQ/L (ref 98–111)
CO2 SERPL-SCNC: 28 MEQ/L (ref 23–33)
CREAT SERPL-MCNC: 0.5 MG/DL (ref 0.4–1.2)
DEPRECATED RDW RBC AUTO: 57.7 FL (ref 35–45)
EOSINOPHIL NFR BLD AUTO: 2.3 %
EOSINOPHILS ABSOLUTE: 0.2 THOU/MM3 (ref 0–0.4)
ERYTHROCYTE [DISTWIDTH] IN BLOOD BY AUTOMATED COUNT: 15.6 % (ref 11.5–14.5)
GFR SERPL CREATININE-BSD FRML MDRD: > 60 ML/MIN/1.73M2
GLUCOSE SERPL-MCNC: 88 MG/DL (ref 70–108)
HCT VFR BLD AUTO: 38.6 % (ref 37–47)
HGB BLD-MCNC: 12.1 GM/DL (ref 12–16)
IMM GRANULOCYTES # BLD AUTO: 0.02 THOU/MM3 (ref 0–0.07)
IMM GRANULOCYTES NFR BLD AUTO: 0.3 %
LYMPHOCYTES ABSOLUTE: 1.4 THOU/MM3 (ref 1–4.8)
LYMPHOCYTES NFR BLD AUTO: 21.8 %
MCH RBC QN AUTO: 32.4 PG (ref 26–33)
MCHC RBC AUTO-ENTMCNC: 31.3 GM/DL (ref 32.2–35.5)
MCV RBC AUTO: 103.5 FL (ref 81–99)
MONOCYTES ABSOLUTE: 0.5 THOU/MM3 (ref 0.4–1.3)
MONOCYTES NFR BLD AUTO: 7.7 %
NEUTROPHILS NFR BLD AUTO: 67.3 %
NRBC BLD AUTO-RTO: 0 /100 WBC
PLATELET # BLD AUTO: 191 THOU/MM3 (ref 130–400)
PMV BLD AUTO: 10 FL (ref 9.4–12.4)
POTASSIUM SERPL-SCNC: 3.8 MEQ/L (ref 3.5–5.2)
RBC # BLD AUTO: 3.73 MILL/MM3 (ref 4.2–5.4)
SEGMENTED NEUTROPHILS ABSOLUTE COUNT: 4.4 THOU/MM3 (ref 1.8–7.7)
SODIUM SERPL-SCNC: 147 MEQ/L (ref 135–145)
WBC # BLD AUTO: 6.6 THOU/MM3 (ref 4.8–10.8)

## 2024-01-08 PROCEDURE — 2580000003 HC RX 258

## 2024-01-08 PROCEDURE — 80048 BASIC METABOLIC PNL TOTAL CA: CPT

## 2024-01-08 PROCEDURE — 6360000002 HC RX W HCPCS: Performed by: EMERGENCY MEDICINE

## 2024-01-08 PROCEDURE — 97535 SELF CARE MNGMENT TRAINING: CPT

## 2024-01-08 PROCEDURE — 97166 OT EVAL MOD COMPLEX 45 MIN: CPT

## 2024-01-08 PROCEDURE — 99239 HOSP IP/OBS DSCHRG MGMT >30: CPT | Performed by: PHYSICIAN ASSISTANT

## 2024-01-08 PROCEDURE — 85025 COMPLETE CBC W/AUTO DIFF WBC: CPT

## 2024-01-08 PROCEDURE — 97530 THERAPEUTIC ACTIVITIES: CPT

## 2024-01-08 PROCEDURE — 6370000000 HC RX 637 (ALT 250 FOR IP): Performed by: PHYSICIAN ASSISTANT

## 2024-01-08 PROCEDURE — 36415 COLL VENOUS BLD VENIPUNCTURE: CPT

## 2024-01-08 PROCEDURE — 85730 THROMBOPLASTIN TIME PARTIAL: CPT

## 2024-01-08 PROCEDURE — 99232 SBSQ HOSP IP/OBS MODERATE 35: CPT | Performed by: PHYSICIAN ASSISTANT

## 2024-01-08 RX ADMIN — HEPARIN SODIUM 13 UNITS/KG/HR: 10000 INJECTION, SOLUTION INTRAVENOUS at 07:52

## 2024-01-08 RX ADMIN — APIXABAN 10 MG: 5 TABLET, FILM COATED ORAL at 11:38

## 2024-01-08 RX ADMIN — TIMOLOL MALEATE 1 DROP: 5 SOLUTION OPHTHALMIC at 07:57

## 2024-01-08 RX ADMIN — SODIUM CHLORIDE, PRESERVATIVE FREE 10 ML: 5 INJECTION INTRAVENOUS at 07:57

## 2024-01-08 NOTE — PROGRESS NOTES
[facility]  INPATIENT OCCUPATIONAL THERAPY  STRZ CCU-STEPDOWN 3B  EVALUATION    Time:   Time In: 801  Time Out: 844  Timed Code Treatment Minutes: 43 Minutes  Minutes: 43          Date: 2024  Patient Name: Tamica Izquierdo,   Gender: female      MRN: [id number]  : 1935  (88 y.o.)  Referring Practitioner: Christine Glez APRN - CNP  Diagnosis: Symptomatic Bradycardia  Additional Pertinent Hx: per H&P: Tamica Izquierdo is a pleasant 88 y.o. female who presents to the emergency department for evaluation of generalized weakness and finger discoloration.  Patient's daughter, who is an RN and her power of  brings the patient in, having noticed the symptoms earlier today.  Patient apparently has sustained several falls over the last few weeks, last 1 being 4 to 5 days ago.  1 of these falls she fractured her odontoid and injured her LLE which has been swollen since.   Etiologies of the falls are unclear. She was seen at an outlying facility and had xrays of both femurs done which was negative.       Today, apparently patient has been very weak. Family noted her R hand to have cyanotic discoloration (mostly 4th and 5th digits) and therefore bring her in.  No falls today.     There have been some discussion in the past about pacemaker versus doing a Watchman, however the decision was made to hold off.  She is maintained on eliquis for her AFib and has been compliant with it.  The patient is not a great historian, states she has been more short of breath recently but denies any chest pain and she is unable to further elaborate on the dyspnea.    Restrictions/Precautions:  Restrictions/Precautions: Fall Risk  Required Braces or Orthoses  Cervical: c-collar  Position Activity Restriction  Spinal Precautions: No Bending, No Lifting, No Twisting  Other position/activity restrictions: C-collar from previous c1-2 fx    Subjective  Chart Reviewed: Yes, History and Physical, Imaging, Orders, Labs,

## 2024-01-08 NOTE — PALLIATIVE CARE
Follow Up / Progress Note        Patient:   Tamica Izquierdo  YOB: 1935  Age:  88 y.o.  Room:  [address] [place] [address]  MRN:  [id number]               Plan/Follow-Up:  Discussed case with primary RN, Ju and patient is being discharged at this time.           Electronically signed by Sariah Sood RN on 1/8/2024 at 1:23 PM             Palliative Care Office: [phone]

## 2024-01-08 NOTE — PLAN OF CARE
Problem: Discharge Planning  Goal: Discharge to home or other facility with appropriate resources  1/8/2024 0853 by Lydia Bergman RN  Outcome: Progressing  Flowsheets (Taken 1/8/2024 0853)  Discharge to home or other facility with appropriate resources:   Identify barriers to discharge with patient and caregiver   Arrange for needed discharge resources and transportation as appropriate   Identify discharge learning needs (meds, wound care, etc)  1/8/2024 0545 by Kiel Holman RN  Outcome: Progressing  Flowsheets (Taken 1/8/2024 0545)  Discharge to home or other facility with appropriate resources: Identify barriers to discharge with patient and caregiver     Problem: Safety - Adult  Goal: Free from fall injury  1/8/2024 0853 by Lydia Bergman RN  Outcome: Progressing  Flowsheets (Taken 1/8/2024 0853)  Free From Fall Injury:   Instruct family/caregiver on patient safety   Based on caregiver fall risk screen, instruct family/caregiver to ask for assistance with transferring infant if caregiver noted to have fall risk factors  1/8/2024 0545 by Kiel Holman RN  Outcome: Progressing  Flowsheets (Taken 1/8/2024 0545)  Free From Fall Injury: Instruct family/caregiver on patient safety     Problem: ABCDS Injury Assessment  Goal: Absence of physical injury  1/8/2024 0853 by Lydia Bergman RN  Outcome: Progressing  Flowsheets (Taken 1/8/2024 0853)  Absence of Physical Injury: Implement safety measures based on patient assessment  1/8/2024 0545 by Kiel Holman RN  Outcome: Progressing  Flowsheets (Taken 1/8/2024 0545)  Absence of Physical Injury: Implement safety measures based on patient assessment     Problem: Skin/Tissue Integrity  Goal: Absence of new skin breakdown  Description: 1.  Monitor for areas of redness and/or skin breakdown  2.  Assess vascular access sites hourly  3.  Every 4-6 hours minimum:  Change oxygen saturation probe site  4.  Every 4-6 hours:  If on nasal continuous positive

## 2024-01-08 NOTE — PLAN OF CARE
Problem: Discharge Planning  Goal: Discharge to home or other facility with appropriate resources  Outcome: Progressing  Flowsheets (Taken 1/8/2024 0545)  Discharge to home or other facility with appropriate resources: Identify barriers to discharge with patient and caregiver     Problem: Safety - Adult  Goal: Free from fall injury  Outcome: Progressing  Flowsheets (Taken 1/8/2024 0545)  Free From Fall Injury: Instruct family/caregiver on patient safety     Problem: ABCDS Injury Assessment  Goal: Absence of physical injury  Outcome: Progressing  Flowsheets (Taken 1/8/2024 0545)  Absence of Physical Injury: Implement safety measures based on patient assessment     Problem: Skin/Tissue Integrity  Goal: Absence of new skin breakdown  Description: 1.  Monitor for areas of redness and/or skin breakdown  2.  Assess vascular access sites hourly  3.  Every 4-6 hours minimum:  Change oxygen saturation probe site  4.  Every 4-6 hours:  If on nasal continuous positive airway pressure, respiratory therapy assess nares and determine need for appliance change or resting period.  Outcome: Progressing     Problem: Chronic Conditions and Co-morbidities  Goal: Patient's chronic conditions and co-morbidity symptoms are monitored and maintained or improved  Outcome: Progressing  Flowsheets (Taken 1/8/2024 0545)  Care Plan - Patient's Chronic Conditions and Co-Morbidity Symptoms are Monitored and Maintained or Improved: Monitor and assess patient's chronic conditions and comorbid symptoms for stability, deterioration, or improvement     Care plan reviewed with patient.  Patient verbalizes understanding of the care plan and contributed to goal setting

## 2024-01-08 NOTE — PROGRESS NOTES
Discussed discharge summary with patient. Instructed patient about medications & follow up appointments. Patient denies any additional questions. Patient was discharged with all belongings. No distress noted. Patient discharged to home. Taken down to the vehicle by this RN per wheelchair.

## 2024-01-08 NOTE — PROGRESS NOTES
CLINICAL PHARMACY: DISCHARGE MED RECONCILIATION/REVIEW    [facility] Select Patient?: No  Total # of Interventions Recommended: 1 - Updated Order #: 1   -   Total # Interventions Accepted: 1  Intervention Severity:   - Level 1 Intervention Present?: No   - Level 2 #: 1   - Level 3 #: 0   Time Spent (min): 30    Additional Documentation:    [facility] in [place], [place] does not stock the Eliquis starter pack.  OK with Hoda Meade to cancel the starter pack and change to Eliquis 10 mg PO BID X 7 days, then 5 mg BID.  Spoke to Pharmacist at Ana [facility] PharmD 1/8/2024 11:21 AM

## 2024-01-08 NOTE — PROGRESS NOTES
Cardiology Progress Note      Patient:  Tamica Izquierdo  YOB: 1935  MRN: [id number]   Acct: [id number]  Admit Date:  1/6/2024  Primary Cardiologist:  [place]     Note per dr holbrook \"Reason for Consultation:  Bradycardia        History Of Present Illness:    88 y.o. female with history of COPD, chronic systolic heart failure with ejection fraction of 30% according to family, frequent falls who brought into the ER for dusky fingers.  Most of the history was obtained from the family who are present in the room.  Daughter is in hospice nurse in [place].  They were apparently in [place] for a wedding.  According to family patient has been weak and unable to walk up stairs.  Had frequent falls.  Family initially stated that they did not want any workup.  They wanted palliative care only.  Patient's heart rate has been as low as 30 bpm.  Was previously told about needing a pacemaker but family refused.  Today after extensive discussion family is requesting to get an echocardiogram.  They are considering possible pacemaker.    Subjective (Events in last 24 hours): pt awake and alert.   NAD. No cp or sob.  No edema or orthopnea.  Has some lightheadedness with standing.  She gets up slowly and the lightheadedness resolves  On 3 l/min O2  On heparin gtt      Objective:   BP (!) 154/73   Pulse 62   Temp 97.7 °F (36.5 °C) (Oral)   Resp 18   Ht 1.676 m (5' 6\")   Wt 62.5 kg (137 lb 12.6 oz)   SpO2 97%   BMI 22.24 kg/m²        TELEMETRY: afib cvr 81    Physical Exam:  General Appearance: alert and oriented to person, place and time, in no acute distress  Cardiovascular: irregularly irregular  Pulmonary/Chest: clear to auscultation bilaterally- no wheezes, rales or rhonchi, normal air movement, no respiratory distress  Abdomen: soft, non-tender, non-distended, normal bowel sounds, no masses Extremities: no cyanosis, clubbing or edema, pulse   Skin: warm and dry  Head: normocephalic and atraumatic  Eyes: pupils equal,

## 2024-01-08 NOTE — DISCHARGE INSTRUCTIONS
HOLD Entresto until you see Cardiologist (Dr. Lagos). Please also check BP twice a day and record to bring to Dr. Lagos['s office.   F/up dr lagos 1-2 weeks

## 2024-01-08 NOTE — PROGRESS NOTES
Pharmacy Discharge Medication Counseling Note    Pt discharged from [facility] today.    Discharge Medications:         Medication List        CHANGE how you take these medications      apixaban 5 MG Tabs tablet  Commonly known as: Eliquis  Take 10 mg = 2 tablets twice daily for 7 days, then decrease to 5 mg = 1 tablet twice daily  What changed:   medication strength  how much to take  how to take this  when to take this  additional instructions            CONTINUE taking these medications      ARIPiprazole 2 MG tablet  Commonly known as: ABILIFY     Entresto 24-26 MG per tablet  Generic drug: sacubitril-valsartan     Jardiance 10 MG tablet  Generic drug: empagliflozin     latanoprost 0.005 % ophthalmic solution  Commonly known as: XALATAN     Lexapro 10 MG tablet  Generic drug: escitalopram     mirtazapine 15 MG tablet  Commonly known as: REMERON     OXcarbazepine 300 MG tablet  Commonly known as: TRILEPTAL     OXYGEN     potassium chloride 20 MEQ packet  Commonly known as: KLOR-CON     spironolactone 25 MG tablet  Commonly known as: ALDACTONE     timolol 0.5 % ophthalmic solution  Commonly known as: BETIMOL            STOP taking these medications      carvedilol 3.125 MG tablet  Commonly known as: COREG     furosemide 40 MG tablet  Commonly known as: LASIX               Where to Get Your Medications        These medications were sent to [facility] [address] [place], [place] [address], [place] - [address] - P [phone] - F [phone]  [facility] [place] [address]      Phone: [phone]   apixaban 5 MG Tabs tablet         Discharge instructions reviewed with the patient.   Patient taught utilizing teach-back method and verbalized understanding.   Provided medication education sheets for all new medications.

## 2024-01-09 NOTE — DISCHARGE SUMMARY
tibial: 33 cm/s.  Dorsalis pedis: 22 cm/s. Left lower extremity arterial peak systolic velocities:  Common femoral: 111 cm/s.  Profunda femoral: 60 cm/s.  Superficial femoral  - Proximal: 69 cm/s.  - Mid: 124 cm/s.  - Distal: 137 cm/s.  Popliteal: 67 cm/s.  Peroneal: Not visualized  Posterior tibial: 47 cm/s.  Anterior tibial: 44 cm/s.  Dorsalis pedis: 29 cm/s. The arterial pulsation pattern on spectral Doppler is irregular. Predominantly biphasic tracings on spectral Doppler involving the bilateral thigh arteries. Predominantly biphasic waveforms in the right anterior and posterior tibial arteries. Monophasic waveforms in the right dorsalis pedis artery. Monophasic waveforms in the left anterior tibial and dorsalis pedis arteries, suggesting severe stenosis. Biphasic waveforms in the left posterior tibial artery.     Impression: 1. Patent bilateral lower extremity arterial vasculature. 2. Monophasic waveforms in the right and left dorsalis pedis and left anterior tibial arteries, suggesting severe stenosis. 3. Biphasic waveforms in the thigh and some of the calf arteries, could suggest mild to moderate disease. 4. Bilateral peroneal artery is not visualized. 5. Irregular arterial pulsation pattern on spectral Doppler, may reflect cardiac dysrhythmia. This document has been electronically signed by: Sid Rashid MD on 01/06/2024 10:52 PM Technique Used: Duplex examination performed utilizing grayscale, color and spectral analysis.    XR TIBIA FIBULA LEFT (2 VIEWS)    Result Date: 1/6/2024  Left tibia and fibula x-ray: 2 views. Indication: Fall. Trauma. Comparison: None Findings: Bony demineralization. No acute fracture or dislocation involving tibia and fibula. No subcutaneous emphysema or radiopaque foreign body in the soft tissue. Subcutaneous edema. Peripheral arterial vascular calcifications in the calf.     Impression: Negative for acute osseous abnormality. This document has been electronically signed by: Sid